# Patient Record
Sex: FEMALE | Race: WHITE | URBAN - METROPOLITAN AREA
[De-identification: names, ages, dates, MRNs, and addresses within clinical notes are randomized per-mention and may not be internally consistent; named-entity substitution may affect disease eponyms.]

---

## 2022-07-04 RX ORDER — LAMOTRIGINE 25 MG/1
TABLET ORAL
COMMUNITY
End: 2022-11-02 | Stop reason: SDUPTHER

## 2022-07-04 RX ORDER — MEDROXYPROGESTERONE ACETATE 150 MG/ML
INJECTION, SUSPENSION INTRAMUSCULAR
COMMUNITY
End: 2022-09-14 | Stop reason: SDUPTHER

## 2022-07-04 RX ORDER — DEXMETHYLPHENIDATE HYDROCHLORIDE 5 MG/1
TABLET ORAL
COMMUNITY
Start: 2021-10-15

## 2022-08-15 RX ORDER — MEDROXYPROGESTERONE ACETATE 150 MG/ML
150 INJECTION, SUSPENSION INTRAMUSCULAR ONCE
Qty: 1 ML | Refills: 0 | Status: SHIPPED | OUTPATIENT
Start: 2022-08-15 | End: 2022-09-14

## 2022-08-23 ENCOUNTER — TELEPHONE (OUTPATIENT)
Dept: OBGYN CLINIC | Age: 22
End: 2022-08-23

## 2022-08-23 NOTE — TELEPHONE ENCOUNTER
----- Message from Luca Jarvis MD sent at 8/19/2022 12:26 PM EDT -----  Make her an appointment to have USG and appointment next week but Ok to get Depo shot if not scheduled before time for shot to be redone. Julia Conception    ----- Message -----  From: Maki Oconnor MA  Sent: 8/19/2022   9:07 AM EDT  To: Luca Jarvis MD    Please see note and advise. Thank you!

## 2022-08-23 NOTE — TELEPHONE ENCOUNTER
Spoke with pt, she states that she did go to [facility] after seeing us in office. She was diagnosed with BV and a UTI and given abx. She is scheduled to come in today, 8/23/22 at 4 pm for depo provera injection and tomorrow at 1:30 pm for ultrasound and visit with Dr. Olivia Shah. She voiced full understanding and is aware. All questions answered.

## 2022-08-24 ENCOUNTER — OFFICE VISIT (OUTPATIENT)
Dept: OBGYN CLINIC | Age: 22
End: 2022-08-24
Payer: COMMERCIAL

## 2022-08-24 VITALS — SYSTOLIC BLOOD PRESSURE: 122 MMHG | DIASTOLIC BLOOD PRESSURE: 60 MMHG | HEIGHT: 61 IN

## 2022-08-24 DIAGNOSIS — R10.2 PELVIC PAIN IN FEMALE: Primary | ICD-10-CM

## 2022-08-24 DIAGNOSIS — N92.6 IRREGULAR MENSTRUATION, UNSPECIFIED: ICD-10-CM

## 2022-08-24 PROCEDURE — 99214 OFFICE O/P EST MOD 30 MIN: CPT | Performed by: OBSTETRICS & GYNECOLOGY

## 2022-08-24 PROCEDURE — 76830 TRANSVAGINAL US NON-OB: CPT | Performed by: OBSTETRICS & GYNECOLOGY

## 2022-08-24 RX ORDER — METRONIDAZOLE 500 MG/1
500 TABLET ORAL 2 TIMES DAILY
COMMUNITY
Start: 2022-08-19 | End: 2022-09-14 | Stop reason: ALTCHOICE

## 2022-08-24 RX ORDER — NITROFURANTOIN 25; 75 MG/1; MG/1
100 CAPSULE ORAL 2 TIMES DAILY
COMMUNITY
Start: 2022-08-19 | End: 2022-09-14 | Stop reason: ALTCHOICE

## 2022-08-24 RX ORDER — ESTRADIOL 2 MG/1
2 TABLET ORAL DAILY
Qty: 14 TABLET | Refills: 3 | Status: SHIPPED | OUTPATIENT
Start: 2022-08-24 | End: 2022-09-14 | Stop reason: ALTCHOICE

## 2022-08-24 NOTE — PROGRESS NOTES
History of Present Illness: The patient presents to the office today in followup after pelvic ultrasound was ordered for further evaluation and management of irregular spotting with the Depo shot. She states that while she is spotting, she also has significant cramping. She is otherwise without complaints or concerns. She denies any discomfort during intercourse. She denies any other symptoms, but states that the spotting that she has had previously on Depo-Provera has never been associated with significant cramping. She simply wanted to know if she had an ovarian cyst or potentially there was some sort of contraceptive failure, if this could represent pregnancy. She is relieved when the ultrasound reveals a normal size, shape and contour uterus with very thin endometrial cavity with intracavitary masses or fluid collections. Both ovaries are visualized bilaterally with numerous peripheral follicles consistent with potential polycystic ovarian syndrome type diagnosis. The patient has been on continuous Depo-Provera with only a nine month break for the last several years. My suspicion is that she indeed probably does have polycystic ovarian syndrome, but without the menstrual cycles to be tracking that is somewhat underrepresented. Nonetheless, the pelvic ultrasound also failed to reproduce any discomfort, which is of note. Physical Exam:  Bimanual exam was also negative. Discussion/Plan:  With this in mind, I have suggested to her that simply stabilizing the lining with an oral estrogen for a two week course would hopefully alleviate any further spotting and ideally if there is no further spotting there should be no further cramping, as this is the only time that she is aware of the discomfort. She is amenable to this plan. We will be available to her on a p.r.n. basis and she will contact the office if anything changes. Her last Depo injection was last week.         Patient counseled face to face for 30 minutes regarding her complaints, differential diagnosis and disposition with greater than 50% of the time spent in this way.

## 2022-08-25 ENCOUNTER — TELEPHONE (OUTPATIENT)
Dept: OBGYN CLINIC | Age: 22
End: 2022-08-25

## 2022-08-25 NOTE — TELEPHONE ENCOUNTER
Pt presents to the office on 08/23 for medroxyprogesterone 150mg injection. Lot number [id number]  Exp date 2026 Sep 30    Next injection due 11/08-11/22    Given RGM. Pt tolerated well with any adverse reaction.

## 2022-09-14 ENCOUNTER — OFFICE VISIT (OUTPATIENT)
Dept: OBGYN CLINIC | Age: 22
End: 2022-09-14
Payer: COMMERCIAL

## 2022-09-14 VITALS
WEIGHT: 136.2 LBS | DIASTOLIC BLOOD PRESSURE: 70 MMHG | SYSTOLIC BLOOD PRESSURE: 110 MMHG | BODY MASS INDEX: 25.71 KG/M2 | HEIGHT: 61 IN

## 2022-09-14 DIAGNOSIS — Z11.3 SCREENING EXAMINATION FOR VENEREAL DISEASE: ICD-10-CM

## 2022-09-14 DIAGNOSIS — Z01.419 WELL WOMAN EXAM: Primary | ICD-10-CM

## 2022-09-14 DIAGNOSIS — Z13.89 SCREENING FOR GENITOURINARY CONDITION: ICD-10-CM

## 2022-09-14 PROCEDURE — 99395 PREV VISIT EST AGE 18-39: CPT | Performed by: OBSTETRICS & GYNECOLOGY

## 2022-09-14 RX ORDER — MEDROXYPROGESTERONE ACETATE 150 MG/ML
INJECTION, SUSPENSION INTRAMUSCULAR
Qty: 1 ML | Refills: 3 | Status: SHIPPED | OUTPATIENT
Start: 2022-09-14

## 2022-09-14 ASSESSMENT — PATIENT HEALTH QUESTIONNAIRE - PHQ9
1. LITTLE INTEREST OR PLEASURE IN DOING THINGS: 0
SUM OF ALL RESPONSES TO PHQ QUESTIONS 1-9: 1
SUM OF ALL RESPONSES TO PHQ9 QUESTIONS 1 & 2: 1
SUM OF ALL RESPONSES TO PHQ QUESTIONS 1-9: 1
2. FEELING DOWN, DEPRESSED OR HOPELESS: 1

## 2022-09-14 NOTE — PROGRESS NOTES
2022    Ivana Cai  2000  Age: 25 y.o. No LMP recorded. Patient has had an injection.   PCP: Ria Fleischer, MD  Patient doessee them for regular preventative visits. HPI: Patient doing well on Depo Provera, no further BTB after estrogen. First ever screening visit. No flowsheet data found. Allergies, medications, past medical and surgical history, social history, family history all reviewed. Review of Systems  Constitutional:  Denies unexplained weight loss or heat or cold intolerance  ENT: Denies change in vision, change in hearing, frequent headaches  Cardiovascular:  Denies chest pain, swelling in legs or feet, shortness of breath when lying flat  Respiratory:  Denies shortness of breath, cough greater than 2 weeks or coughing up blood  Gastro: Denies diarrhea greater than 2 weeks, rectal bleeding, bloody stools, heartburn, or constipation  :  Denies blood in urine, nocturia, dysuria or incontinence  Breast:  Denies nipple discharge, masses or pain  Skin:  Denies rash greater than 2 weeks, change in moles  Musculoskeletal/Neuro:  Denies joint pain, muscle weakness, seizures, loss of balance or frequent falls  Psych:  Denies frequent crying spells or severe anxiety  Heme:  Denies easy bruising, bleeding gums, frequent nosebleeds or swollen lymph nodes  GYN:  Denies bleeding or spotting between menses, heavy menses, menses longer than 7 days, pain with sex, severe menstrual cramps. Vitals:    22 1510   BP: 110/70   Site: Right Upper Arm   Position: Sitting   Weight: 136 lb 3.2 oz (61.8 kg)   Height: 5' 1\" (1.549 m)       Body mass index is 25.73 kg/m². Pt in no distress. Alert and oriented x3. Affect bright. Well developed, well nourished. HEENT: normocephalic, atraumatic. Sclerae nonicteric. Neck supple w/o thyromegaly. Trachea midline. Heart: regular rate and rhythm, no murmur or gallop  Lungs: clear to auscultation.  Respiratory effort normal.  Breasts: no masses or axillary lymphadenopathy  Abdomen: soft and nontender, no masses, no organomegaly, no ventral hernia  Pelvic: normal external genitalia, urethral meatus, urethra, vagina and cervix. Bimanual exam w/o masses or tenderness. Bladder nontender. Uterus normal size. Adnexae normal.  Perineum and anus normal. No hemorrhoids. Rectovaginal: no masses. Hemocult negative. There is no problem list on file for this patient. Orders Placed This Encounter   Procedures    PAP IG, CT-NG-TV, rfx Aptima HPV ASCUS ([id number])    AMB POC URINALYSIS DIP STICK MANUAL W/O MICRO          Pt counseled about routine screening recommendations. Depo Provera refills.       Bryan Myrick MD

## 2022-09-20 LAB
C TRACH RRNA CVX QL NAA+PROBE: NEGATIVE
CYTOLOGIST CVX/VAG CYTO: NORMAL
CYTOLOGY CVX/VAG DOC THIN PREP: NORMAL
HPV REFLEX: NORMAL
Lab: NORMAL
N GONORRHOEA RRNA CVX QL NAA+PROBE: NEGATIVE
PATH REPORT.FINAL DX SPEC: NORMAL
STAT OF ADQ CVX/VAG CYTO-IMP: NORMAL
T VAGINALIS RRNA SPEC QL NAA+PROBE: NEGATIVE

## 2022-11-02 ENCOUNTER — OFFICE VISIT (OUTPATIENT)
Dept: OBGYN CLINIC | Age: 22
End: 2022-11-02
Payer: COMMERCIAL

## 2022-11-02 VITALS
DIASTOLIC BLOOD PRESSURE: 62 MMHG | BODY MASS INDEX: 24.47 KG/M2 | WEIGHT: 129.6 LBS | SYSTOLIC BLOOD PRESSURE: 106 MMHG | HEIGHT: 61 IN

## 2022-11-02 DIAGNOSIS — R10.2 PELVIC PAIN: Primary | ICD-10-CM

## 2022-11-02 DIAGNOSIS — N64.4 PAIN OF BOTH BREASTS: ICD-10-CM

## 2022-11-02 DIAGNOSIS — N92.6 IRREGULAR BLEEDING: ICD-10-CM

## 2022-11-02 LAB
BILIRUBIN, URINE, POC: NEGATIVE
BLOOD URINE, POC: NEGATIVE
GLUCOSE URINE, POC: NEGATIVE
HCG, PREGNANCY, URINE, POC: NEGATIVE
KETONES, URINE, POC: NEGATIVE
LEUKOCYTE ESTERASE, URINE, POC: NORMAL
NITRITE, URINE, POC: NEGATIVE
PH, URINE, POC: 7 (ref 4.6–8)
PROTEIN,URINE, POC: NEGATIVE
SPECIFIC GRAVITY, URINE, POC: 1.01 (ref 1–1.03)
URINALYSIS CLARITY, POC: NORMAL
URINALYSIS COLOR, POC: NORMAL
UROBILINOGEN, POC: NORMAL
VALID INTERNAL CONTROL, POC: YES

## 2022-11-02 PROCEDURE — 81025 URINE PREGNANCY TEST: CPT | Performed by: OBSTETRICS & GYNECOLOGY

## 2022-11-02 PROCEDURE — 81002 URINALYSIS NONAUTO W/O SCOPE: CPT | Performed by: OBSTETRICS & GYNECOLOGY

## 2022-11-02 PROCEDURE — 99214 OFFICE O/P EST MOD 30 MIN: CPT | Performed by: OBSTETRICS & GYNECOLOGY

## 2022-11-02 ASSESSMENT — PATIENT HEALTH QUESTIONNAIRE - PHQ9
SUM OF ALL RESPONSES TO PHQ QUESTIONS 1-9: 1
1. LITTLE INTEREST OR PLEASURE IN DOING THINGS: 0
SUM OF ALL RESPONSES TO PHQ QUESTIONS 1-9: 1
SUM OF ALL RESPONSES TO PHQ9 QUESTIONS 1 & 2: 1
SUM OF ALL RESPONSES TO PHQ QUESTIONS 1-9: 1
2. FEELING DOWN, DEPRESSED OR HOPELESS: 1
SUM OF ALL RESPONSES TO PHQ QUESTIONS 1-9: 1

## 2022-11-02 NOTE — PROGRESS NOTES
History of Present Illness: The patient is a pleasant 25year-old young lady who returns to the office today for further evaluation and management of irregular bleeding and pelvic pain on Depo-Provera. She was seen and evaluated several weeks ago for this same complaint and ultimately was noted to have thin endometrium on pelvic ultrasound. She was treated with oral Estradiol, which stopped the bleeding that she was experiencing at that point in time, but then admits that approximately eight weeks later resumed bleeding again and for this reason she returns to the office. The bleeding is problematic for her, but primarily her main concern is related to the lower abdominal and pelvic pain, which she is aware of intermittently throughout this time. She does admit menstrual type crampiness while she is bleeding, but that in between bleeding episodes she also has lower abdominal pain. During the course of her previous pelvic ultrasound, it was noted that pelvic ultrasound did not reproduce her pain. Physical Exam:  Pelvic exam today reveals freely mobile uterus with no cervical motion tenderness and no specific reproducibility of discomfort, but that with abdominal hand applied that there is reproducible discomfort in the midline and the lower abdomen below the umbilicus above the uterus. Assessment and Plan:  The patient has always been told that she had endometriosis. She has never had diagnostic laparoscopy to confirm this. Pelvic ultrasound failed to identify any overt findings that would suggest this, although bilateral polycystic ovaries were noted. Due to the persistent nature of her pelvic pain, I have suggested to her that at this point I recommend diagnostic laparoscopy as truly the next best step as far as management is difficult to discern. She will consider this treatment option, discuss this with her family and notify our office if she desires to schedule this.   Additionally at this appointment, during the pelvic exam wet prep was collected, which failed to identify any clue cells, hyphal elements or trichomonads. The patient is relieved to hear this. She is counseled that we will be available to her on a p.r.n. basis. I have offered to refill her Estradiol if she desires to resume this simply to stabilize the lining in an effort to reduce the amount of irregular bleeding she is aware of, because it is not heavy, but generally merely spotting with mucus, she is unsure if she will get this filled. We will await to hear from her regarding scheduling of surgery.

## 2022-11-08 RX ORDER — MEDROXYPROGESTERONE ACETATE 150 MG/ML
INJECTION, SUSPENSION INTRAMUSCULAR
OUTPATIENT
Start: 2022-11-08

## 2022-11-14 ENCOUNTER — TELEPHONE (OUTPATIENT)
Dept: OBGYN CLINIC | Age: 22
End: 2022-11-14

## 2022-11-14 RX ORDER — MEDROXYPROGESTERONE ACETATE 150 MG/ML
150 INJECTION, SUSPENSION INTRAMUSCULAR ONCE
Qty: 1 ML | Refills: 0 | Status: SHIPPED | OUTPATIENT
Start: 2022-11-14 | End: 2022-11-14

## 2022-11-14 NOTE — TELEPHONE ENCOUNTER
Orders Placed This Encounter    medroxyPROGESTERone (DEPO-PROVERA) 150 MG/ML injection     Sig: Inject 1 mL into the muscle once for 1 dose     Dispense:  1 mL     Refill:  0

## 2022-11-14 NOTE — TELEPHONE ENCOUNTER
Needs [facility]. Has appointment tomorrow for injection.

## 2022-11-16 ENCOUNTER — HOSPITAL ENCOUNTER (OUTPATIENT)
Dept: MAMMOGRAPHY | Age: 22
Discharge: HOME | End: 2022-11-19
Payer: COMMERCIAL

## 2022-11-16 ENCOUNTER — APPOINTMENT (OUTPATIENT)
Dept: MAMMOGRAPHY | Age: 22
End: 2022-11-16
Payer: COMMERCIAL

## 2022-11-16 DIAGNOSIS — N64.4 PAIN OF BOTH BREASTS: ICD-10-CM

## 2022-11-16 PROCEDURE — 76642 ULTRASOUND BREAST LIMITED: CPT

## 2023-01-30 ENCOUNTER — TELEPHONE (OUTPATIENT)
Dept: OBGYN CLINIC | Age: 23
End: 2023-01-30

## 2023-01-30 RX ORDER — MEDROXYPROGESTERONE ACETATE 150 MG/ML
150 INJECTION, SUSPENSION INTRAMUSCULAR ONCE
Qty: 1 ML | Refills: 0 | Status: SHIPPED | OUTPATIENT
Start: 2023-01-30 | End: 2023-01-30

## 2023-01-30 NOTE — TELEPHONE ENCOUNTER
Depoprovera injection scheduled for this Friday, 2-3-23. Needs medication sent to [facility] in [facility].

## 2023-04-27 RX ORDER — MEDROXYPROGESTERONE ACETATE 150 MG/ML
150 INJECTION, SUSPENSION INTRAMUSCULAR ONCE
Qty: 1 ML | Refills: 0 | OUTPATIENT
Start: 2023-04-27 | End: 2023-04-27

## 2023-07-24 RX ORDER — MEDROXYPROGESTERONE ACETATE 150 MG/ML
150 INJECTION, SUSPENSION INTRAMUSCULAR ONCE
Qty: 1 ML | Refills: 0 | OUTPATIENT
Start: 2023-07-24 | End: 2023-07-24

## 2023-08-17 ENCOUNTER — TELEPHONE (OUTPATIENT)
Dept: OBGYN CLINIC | Age: 23
End: 2023-08-17

## 2023-08-17 NOTE — TELEPHONE ENCOUNTER
Patient called our office on 08/17/2023 to make an appointment for her de provera injection. She is having her records sent from [place] to our office. Please advise her of time frame when she make an appointment .    Thank you

## 2023-08-22 RX ORDER — MEDROXYPROGESTERONE ACETATE 150 MG/ML
INJECTION, SUSPENSION INTRAMUSCULAR
Qty: 1 ML | Refills: 0 | Status: CANCELLED | OUTPATIENT
Start: 2023-08-22

## 2023-08-22 RX ORDER — MEDROXYPROGESTERONE ACETATE 150 MG/ML
150 INJECTION, SUSPENSION INTRAMUSCULAR ONCE
Qty: 1 ML | Refills: 0 | Status: SHIPPED | OUTPATIENT
Start: 2023-08-22 | End: 2023-08-22

## 2023-08-22 NOTE — TELEPHONE ENCOUNTER
Spoke with pt, she reports that she last had intercourse at the end of June. She is aware that she will sign a form in the office to attest to this and take a urine pregnancy test. She is scheduled for 8/23/23, all questions answered and pt advised to call back PRN.

## 2023-08-23 ENCOUNTER — OFFICE VISIT (OUTPATIENT)
Dept: OBGYN CLINIC | Age: 23
End: 2023-08-23

## 2023-08-23 DIAGNOSIS — Z30.42 ENCOUNTER FOR DEPO-PROVERA CONTRACEPTION: Primary | ICD-10-CM

## 2023-08-23 NOTE — PROGRESS NOTES
Pt presents today for Medroxyprogesterone injection 150mg/ml  Pt is late getting her shot. [facility] per Dr Tellez Courser to proceed with injection as long as patient has not had intercourse in the last 2 weeks. Pt confirms that she has not had intercourse in the last 2 weeks. She needs a urine pregnancy test, as long as it is negative she can get her injection. UPT today is negative    Pt signed consent stating that she has not had intercourse in the last 2 weeks.     Given LGM  Lot number [id number]  Exp date 05/2025    Next injection due 11/08-11/22  Pt tolerated well, no adverse reactions

## 2023-10-31 ENCOUNTER — OFFICE VISIT (OUTPATIENT)
Dept: OBGYN CLINIC | Age: 23
End: 2023-10-31
Payer: COMMERCIAL

## 2023-10-31 VITALS
WEIGHT: 149.8 LBS | HEART RATE: 98 BPM | RESPIRATION RATE: 16 BRPM | HEIGHT: 60 IN | SYSTOLIC BLOOD PRESSURE: 118 MMHG | DIASTOLIC BLOOD PRESSURE: 68 MMHG | BODY MASS INDEX: 29.41 KG/M2 | OXYGEN SATURATION: 98 %

## 2023-10-31 DIAGNOSIS — Z13.89 SCREENING FOR GENITOURINARY CONDITION: ICD-10-CM

## 2023-10-31 DIAGNOSIS — Z01.419 WELL WOMAN EXAM: Primary | ICD-10-CM

## 2023-10-31 LAB
BILIRUBIN, URINE, POC: NEGATIVE
BLOOD URINE, POC: ABNORMAL
GLUCOSE URINE, POC: NEGATIVE
KETONES, URINE, POC: ABNORMAL
LEUKOCYTE ESTERASE, URINE, POC: ABNORMAL
NITRITE, URINE, POC: NEGATIVE
PH, URINE, POC: 6 (ref 4.6–8)
PROTEIN,URINE, POC: NEGATIVE
SPECIFIC GRAVITY, URINE, POC: 1.01 (ref 1–1.03)
URINALYSIS CLARITY, POC: ABNORMAL
URINALYSIS COLOR, POC: ABNORMAL
UROBILINOGEN, POC: NORMAL

## 2023-10-31 PROCEDURE — 99395 PREV VISIT EST AGE 18-39: CPT | Performed by: OBSTETRICS & GYNECOLOGY

## 2023-10-31 PROCEDURE — 81003 URINALYSIS AUTO W/O SCOPE: CPT | Performed by: OBSTETRICS & GYNECOLOGY

## 2023-10-31 RX ORDER — MEDROXYPROGESTERONE ACETATE 150 MG/ML
INJECTION, SUSPENSION INTRAMUSCULAR
Qty: 1 ML | Refills: 3 | Status: SHIPPED | OUTPATIENT
Start: 2023-10-31

## 2023-10-31 NOTE — PROGRESS NOTES
HPI:  Ms. Saima Loo is a 26 y.o.   OB History          0    Para   0    Term   0       0    AB   0    Living   0         SAB   0    IAB   0    Ectopic   0    Molar   0    Multiple   0    Live Births   0             who is here today for a well woman exam. Happy with Depo provera     Date Performed Result   PAP 09/15/22 Wnl    Mammogram never    Colonoscopy  Wnl per pt   Dexa never                  GYN History           No LMP recorded. Cycle Length 0 Lasting 0 amenorrheic on depo  negative dysmenorrhea; negative postcoital bleeding    Past Medical History:  Past Medical History:   Diagnosis Date    ADHD (attention deficit hyperactivity disorder)     Anxiety     Depression     GERD (gastroesophageal reflux disease)     IBS (irritable bowel syndrome)     Vocal cord dysfunction        Past Surgical History:  Past Surgical History:   Procedure Laterality Date    WISDOM TOOTH EXTRACTION         Allergies: Allergies   Allergen Reactions    Amoxicillin-Pot Clavulanate Hives    Amoxicillin-Pot Clavulanate      Other reaction(s): Hives       Medication History:  Current Outpatient Medications   Medication Sig Dispense Refill    amitriptyline (ELAVIL) 10 MG tablet Take 1 tablet by mouth nightly 30 tablet 2    butalbital-acetaminophen-caffeine (FIORICET, ESGIC) -40 MG per tablet Take 1 tablet by mouth every 6 hours as needed for Headaches 30 tablet 0    dexmethylphenidate (FOCALIN) 5 MG tablet Take 1 tablet by mouth 3 times daily for 30 days. Max Daily Amount: 15 mg 90 tablet 0    [START ON 2023] dexmethylphenidate (FOCALIN) 5 MG tablet Take 1 tablet by mouth 3 times daily for 30 days. Max Daily Amount: 15 mg 90 tablet 0    [START ON 2023] dexmethylphenidate (FOCALIN) 5 MG tablet Take 1 tablet by mouth 3 times daily for 30 days.  Max Daily Amount: 15 mg 90 tablet 0    medroxyPROGESTERone (DEPO-PROVERA) 150 MG/ML injection Inject 1 mL into the muscle once for 1 dose 1 mL 0

## 2023-11-06 ENCOUNTER — TELEPHONE (OUTPATIENT)
Dept: OBGYN CLINIC | Age: 23
End: 2023-11-06

## 2023-11-06 NOTE — TELEPHONE ENCOUNTER
Called pharmacy, patient was trying to fill the Rx too soon. She will go ahead and put it through to patient insurance. It should be ready for  in Wednesday. Called pt, she is aware of above. Rx should be available for her to  on Wednesday. Voiced full understanding.

## 2023-11-06 NOTE — TELEPHONE ENCOUNTER
[facility] with [facility] not [facility] her depo-provera injection because they claim it is too early . Her appt date is 11/08/2023.  Last injection was on 08- / should be between 11-8-2023 to 11- according to calendar  Please advise

## 2023-11-08 ENCOUNTER — OFFICE VISIT (OUTPATIENT)
Dept: OBGYN CLINIC | Age: 23
End: 2023-11-08

## 2023-11-08 DIAGNOSIS — Z30.42 ENCOUNTER FOR DEPO-PROVERA CONTRACEPTION: Primary | ICD-10-CM

## 2023-11-08 NOTE — PROGRESS NOTES
Pt presents for medroxyprogesterone 140 mg IM.    Given right dorogluteal  Lot Number [id number]  Saleem Date 05/2025  Pt tolerated well with no adverse reactions  Return Jan 24-Feb 7 for next injection

## 2024-01-22 RX ORDER — MEDROXYPROGESTERONE ACETATE 150 MG/ML
INJECTION, SUSPENSION INTRAMUSCULAR
Qty: 1 ML | Refills: 0 | Status: SHIPPED | OUTPATIENT
Start: 2024-01-22

## 2024-01-25 ENCOUNTER — NURSE ONLY (OUTPATIENT)
Dept: OBGYN CLINIC | Age: 24
End: 2024-01-25

## 2024-01-25 DIAGNOSIS — Z30.42 ENCOUNTER FOR DEPO-PROVERA CONTRACEPTION: Primary | ICD-10-CM

## 2024-01-25 NOTE — PROGRESS NOTES
Pt presents to the office for medroxyprogesterone injection. Pt was given 150 mg medroxyprogesterone IM into the LGM. Pt tolerated well without adverse reaction.     NDC: 44080-973-08  Lot: [id number]  Exp: 01/31/2026

## 2024-02-20 PROBLEM — F41.1 GAD (GENERALIZED ANXIETY DISORDER): Status: ACTIVE | Noted: 2024-02-20

## 2024-02-20 PROBLEM — F90.2 ADHD (ATTENTION DEFICIT HYPERACTIVITY DISORDER), COMBINED TYPE: Status: ACTIVE | Noted: 2024-02-20

## 2024-04-15 ENCOUNTER — TELEPHONE (OUTPATIENT)
Dept: OBGYN CLINIC | Age: 24
End: 2024-04-15

## 2024-04-15 NOTE — TELEPHONE ENCOUNTER
Patient called stating that she was seen at [facility] and was taken off depo provera and put on lo loestrin. She states that she was advised that she can skip placebo pills and wanted to know if she should expect any side effects. Advised pt that she likely will not have a period if she skips placebo pills. Advised that she should give her body a full 3 months to adjust to the new hormone, she may experience irregular bleeding during this time. She is advised to contact the office with any further questions or concerns. She voiced full understanding and is aware. All questions answered.

## 2024-04-22 RX ORDER — MEDROXYPROGESTERONE ACETATE 150 MG/ML
INJECTION, SUSPENSION INTRAMUSCULAR
Qty: 1 ML | Refills: 0 | OUTPATIENT
Start: 2024-04-22